# Patient Record
Sex: FEMALE | Race: WHITE | NOT HISPANIC OR LATINO | Employment: UNEMPLOYED | URBAN - NONMETROPOLITAN AREA
[De-identification: names, ages, dates, MRNs, and addresses within clinical notes are randomized per-mention and may not be internally consistent; named-entity substitution may affect disease eponyms.]

---

## 2021-01-11 ENCOUNTER — HOSPITAL ENCOUNTER (INPATIENT)
Facility: HOSPITAL | Age: 41
LOS: 3 days | Discharge: HOME | End: 2021-01-14
Attending: PSYCHIATRY & NEUROLOGY | Admitting: PSYCHIATRY & NEUROLOGY

## 2021-01-11 PROBLEM — R45.851 SUICIDAL IDEATIONS: Status: ACTIVE | Noted: 2021-01-11

## 2021-01-11 LAB — SARS-COV-2 RDRP RESP QL NAA+PROBE: NORMAL

## 2021-01-11 PROCEDURE — 25010000002 HALOPERIDOL LACTATE PER 5 MG: Performed by: PSYCHIATRY & NEUROLOGY

## 2021-01-11 PROCEDURE — 87635 SARS-COV-2 COVID-19 AMP PRB: CPT | Performed by: PSYCHIATRY & NEUROLOGY

## 2021-01-11 PROCEDURE — 25010000002 LORAZEPAM PER 2 MG: Performed by: PSYCHIATRY & NEUROLOGY

## 2021-01-11 PROCEDURE — 25010000002 DIPHENHYDRAMINE PER 50 MG: Performed by: PSYCHIATRY & NEUROLOGY

## 2021-01-11 RX ORDER — ACETAMINOPHEN 325 MG/1
650 TABLET ORAL EVERY 6 HOURS PRN
Status: DISCONTINUED | OUTPATIENT
Start: 2021-01-11 | End: 2021-01-14 | Stop reason: HOSPADM

## 2021-01-11 RX ORDER — ALUMINA, MAGNESIA, AND SIMETHICONE 2400; 2400; 240 MG/30ML; MG/30ML; MG/30ML
15 SUSPENSION ORAL EVERY 6 HOURS PRN
Status: DISCONTINUED | OUTPATIENT
Start: 2021-01-11 | End: 2021-01-14 | Stop reason: HOSPADM

## 2021-01-11 RX ORDER — DIPHENHYDRAMINE HYDROCHLORIDE 50 MG/ML
50 INJECTION INTRAMUSCULAR; INTRAVENOUS ONCE
Status: COMPLETED | OUTPATIENT
Start: 2021-01-11 | End: 2021-01-11

## 2021-01-11 RX ORDER — ECHINACEA PURPUREA EXTRACT 125 MG
2 TABLET ORAL AS NEEDED
Status: DISCONTINUED | OUTPATIENT
Start: 2021-01-11 | End: 2021-01-14 | Stop reason: HOSPADM

## 2021-01-11 RX ORDER — LOPERAMIDE HYDROCHLORIDE 2 MG/1
2 CAPSULE ORAL
Status: DISCONTINUED | OUTPATIENT
Start: 2021-01-11 | End: 2021-01-14 | Stop reason: HOSPADM

## 2021-01-11 RX ORDER — HYDROXYZINE 50 MG/1
50 TABLET, FILM COATED ORAL EVERY 6 HOURS PRN
Status: DISCONTINUED | OUTPATIENT
Start: 2021-01-11 | End: 2021-01-14 | Stop reason: HOSPADM

## 2021-01-11 RX ORDER — FAMOTIDINE 20 MG/1
20 TABLET, FILM COATED ORAL 2 TIMES DAILY PRN
Status: DISCONTINUED | OUTPATIENT
Start: 2021-01-11 | End: 2021-01-14 | Stop reason: HOSPADM

## 2021-01-11 RX ORDER — TRAZODONE HYDROCHLORIDE 50 MG/1
50 TABLET ORAL NIGHTLY PRN
Status: DISCONTINUED | OUTPATIENT
Start: 2021-01-11 | End: 2021-01-14 | Stop reason: HOSPADM

## 2021-01-11 RX ORDER — BENZONATATE 100 MG/1
100 CAPSULE ORAL 3 TIMES DAILY PRN
Status: DISCONTINUED | OUTPATIENT
Start: 2021-01-11 | End: 2021-01-14 | Stop reason: HOSPADM

## 2021-01-11 RX ORDER — BENZTROPINE MESYLATE 1 MG/1
2 TABLET ORAL ONCE AS NEEDED
Status: DISCONTINUED | OUTPATIENT
Start: 2021-01-11 | End: 2021-01-14 | Stop reason: HOSPADM

## 2021-01-11 RX ORDER — LORAZEPAM 2 MG/ML
2 INJECTION INTRAMUSCULAR ONCE
Status: COMPLETED | OUTPATIENT
Start: 2021-01-11 | End: 2021-01-11

## 2021-01-11 RX ORDER — IBUPROFEN 400 MG/1
400 TABLET ORAL EVERY 6 HOURS PRN
Status: DISCONTINUED | OUTPATIENT
Start: 2021-01-11 | End: 2021-01-14 | Stop reason: HOSPADM

## 2021-01-11 RX ORDER — BENZTROPINE MESYLATE 1 MG/ML
1 INJECTION INTRAMUSCULAR; INTRAVENOUS ONCE AS NEEDED
Status: DISCONTINUED | OUTPATIENT
Start: 2021-01-11 | End: 2021-01-14 | Stop reason: HOSPADM

## 2021-01-11 RX ORDER — HALOPERIDOL 5 MG/ML
5 INJECTION INTRAMUSCULAR ONCE
Status: COMPLETED | OUTPATIENT
Start: 2021-01-11 | End: 2021-01-11

## 2021-01-11 RX ORDER — NICOTINE 21 MG/24HR
1 PATCH, TRANSDERMAL 24 HOURS TRANSDERMAL
Status: DISCONTINUED | OUTPATIENT
Start: 2021-01-11 | End: 2021-01-14 | Stop reason: HOSPADM

## 2021-01-11 RX ORDER — ONDANSETRON 4 MG/1
4 TABLET, FILM COATED ORAL EVERY 6 HOURS PRN
Status: DISCONTINUED | OUTPATIENT
Start: 2021-01-11 | End: 2021-01-14 | Stop reason: HOSPADM

## 2021-01-11 RX ADMIN — Medication 1 PATCH: at 18:10

## 2021-01-11 RX ADMIN — HALOPERIDOL LACTATE 5 MG: 5 INJECTION, SOLUTION INTRAMUSCULAR at 17:16

## 2021-01-11 RX ADMIN — DIPHENHYDRAMINE HYDROCHLORIDE 50 MG: 50 INJECTION, SOLUTION INTRAMUSCULAR; INTRAVENOUS at 17:16

## 2021-01-11 RX ADMIN — LORAZEPAM 2 MG: 2 INJECTION INTRAMUSCULAR; INTRAVENOUS at 17:16

## 2021-01-12 RX ORDER — LAMOTRIGINE 100 MG/1
25 TABLET ORAL DAILY
Status: DISCONTINUED | OUTPATIENT
Start: 2021-01-12 | End: 2021-01-14 | Stop reason: HOSPADM

## 2021-01-12 RX ADMIN — Medication 1 PATCH: at 09:56

## 2021-01-12 RX ADMIN — Medication 100 MG: at 14:09

## 2021-01-12 RX ADMIN — LAMOTRIGINE 25 MG: 100 TABLET ORAL at 14:09

## 2021-01-12 RX ADMIN — TRAZODONE HYDROCHLORIDE 50 MG: 50 TABLET ORAL at 20:15

## 2021-01-12 NOTE — DISCHARGE INSTR - APPOINTMENTS
[address] [address]  [phone]    January 18 2021 at 11:00am with Vianney  February 3 2021 at 9:00am with Dr Brady.
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
## 2021-01-12 NOTE — H&P
"Patient Care Team:  Provider, No Known as PCP - General    Chief Complaint: \"I went out of my medications 3 years ago, was on Lamictal Seroquel and Klonopin.  I  started feeling my head spiraling again 6 months ago but did not see any doctor,, began to have suicidal thoughts last weekend, felt my family will be happier without me, started cutting my ankle on Saturday, she does multiple abrasions on her left ankle.. Describes stressors as \"too much pressure on me, my son is autistic and has epilepsy my  has epilepsy and brain damage\"    Subjective     History of Present Illness: Patient is a 40-year-old  female  the second time since 4 years, first marriage lasted 11 years, mother of 6 children ranging in ages from 22-12, currently living with her  and 3 of her younger children also has a stepdaughter 6-year-old at home, currently  being supported financially by her .  She was admitted on 1/11/2021 after she was referred as a direct admission from [facility] where she had presented reporting suicidal thoughts with plans to shoot herself reported that a few days ago she got her 's gun but could not figure out how to use it.    I saw the patient on 1/12/2021 when she listed a chief complaint as described above.   She admitted that she drinks mostly on weekends with her friends but has not drank any in 2 months.  She admits that she smokes pot every day and has been doing this since 10 years    Legal History: She denied that she has ever been in trouble with law    Past Psychiatric History: Patient gives history of a psychiatric hospitalization in [place] 20 years ago.  Says she had taken different medications off and on.  She last was receiving psychiatric treatment in [place] 3 years ago when she took Lamictal Seroquel and Klonopin.  She says she has been diagnosed to have bipolar disorder PTSD and borderline personality.      History Elda Galvez is her primary " care provider in [facility].  She denies any known medical problems.  She has had partial hysterectomy, double hernia operation for umbilical hernia and also transvaginal sling for urinary incontinence  Past Medical History:   Diagnosis Date   • Bipolar disorder (CMS/[facility])    • Obsessive-compulsive disorder    • Self-injurious behavior    • Suicidal thoughts    • Suicide attempt (CMS/[facility])      Past Surgical History:   Procedure Laterality Date   • HYSTERECTOMY       Family History   Family history unknown: Yes     Social History     Tobacco Use   • Smoking status: Current Every Day Smoker   • Smokeless tobacco: Never Used   Substance Use Topics   • Alcohol use: Never     Frequency: Never   • Drug use: Yes     Types: Marijuana     No medications prior to admission.     Allergies:  Aspartame, Aspirin, Paxil [paroxetine], Pseudoephedrine hcl, Sulfa antibiotics, and Tdvax [tetanus-diphtheria toxoids td]  She has also told me that she is allergic to all penicillins tetracyclines and sulfa  Family history her sister has bipolar disorder  Personal history she has 1 year of college education she has not worked any in the last 4 years  Review of Systems:     Constitution: No complaints  Eyes: Negative  ENT: Negative  Respiratory: Negative  Cardiovascular: Negative  Gastrointestinal: Negative  Genitourinary: Negative  Musculoskeletal: Negative  Neurological: Negative    Mental Status Exam:    Hygiene:   good  Cooperation:  Cooperative  Eye Contact:  Good  Psychomotor Behavior:  Appropriate  Affect:  Appropriate  Speech:  Normal  Thought Progress:  Goal directed  Thought Content:  Mood congruent  Suicidal:  None  Homicidal:  None  Hallucinations:  None  Delusion: None  Memory:  Intact  Orientation:  Person, Place and Time  Reliability:  fair  Insight:  Fair  Judgement:  Fair      Physical Exam:      General Appearance:    Alert, cooperative, in no acute distress   Head:    Normocephalic, without obvious abnormality,  atraumatic   Eyes:            Lids and lashes normal, conjunctivae and sclerae normal, no   icterus, no pallor, corneas clear, PERRLA   Ears:    Ears appear intact with no abnormalities noted   Throat:   No oral lesions, no thrush, oral mucosa moist   Neck:   No adenopathy, supple, trachea midline, no thyromegaly, no     carotid bruit, no JVD   Back:     No kyphosis present, no scoliosis present, no skin lesions,       erythema or scars, no tenderness to percussion or                   palpation,   range of motion normal   Lungs:     Clear to auscultation,respirations regular, even and                   unlabored    Heart:    Regular rhythm and normal rate, normal S1 and S2, no            murmur, no gallop, no rub, no click   Breast Exam:    Deferred   Abdomen:     Normal bowel sounds, no masses, no organomegaly, soft        non-tender, non-distended, no guarding, no rebound                 tenderness   Genitalia:    Deferred   Extremities:   Moves all extremities well, no edema, no cyanosis, no              redness   Pulses:   Pulses palpable and equal bilaterally   Skin:   No bleeding, bruising or rash   Lymph nodes:   No palpable adenopathy   Neurologic:   Cranial nerves 2 - 12 grossly intact, sensation intact, DTR        present and equal bilaterally       Objective     Vital Signs    Temp:  [97 °F (36.1 °C)-98.1 °F (36.7 °C)] 98.1 °F (36.7 °C)  Heart Rate:  [79-94] 90  Resp:  [18] 18  BP: (104-128)/(67-88) 104/68    Lab Results:   Lab Results (last 24 hours)     Procedure Component Value Units Date/Time    COVID PRE-OP / PRE-PROCEDURE SCREENING ORDER (NO ISOLATION) - Swab, Nasopharynx [[id number]]  (Normal) Collected: 01/11/21 1717    Specimen: Swab from Nasopharynx Updated: 01/11/21 1741    Narrative:      The following orders were created for panel order COVID PRE-OP / PRE-PROCEDURE SCREENING ORDER (NO ISOLATION) - Swab, Nasopharynx.  Procedure                               Abnormality         Status                      ---------                               -----------         ------                     COVID-19, ABBOTT IN-HOUS...[[id number]]  Normal              Final result                 Please view results for these tests on the individual orders.    COVID-19, ABBOTT IN-HOUSE,NASAL Swab (NO TRANSPORT MEDIA) 2 HR TAT - Swab, Nasopharynx [[id number]]  (Normal) Collected: 01/11/21 1717    Specimen: Swab from Nasopharynx Updated: 01/11/21 1741     COVID19 Presumptive Negative    Narrative:      Fact sheet for providers: https://www.fda.gov/media/593512/download     Fact sheet for patients: https://www.fda.gov/media/151827/download    Test performed by PCR.  If inconsistent with clinical signs and symptoms patient should be tested with different authorized molecular test.           Labs:     Lab Results (last 24 hours)     Procedure Component Value Units Date/Time    COVID PRE-OP / PRE-PROCEDURE SCREENING ORDER (NO ISOLATION) - Swab, Nasopharynx [[id number]]  (Normal) Collected: 01/11/21 1717    Specimen: Swab from Nasopharynx Updated: 01/11/21 1741    Narrative:      The following orders were created for panel order COVID PRE-OP / PRE-PROCEDURE SCREENING ORDER (NO ISOLATION) - Swab, Nasopharynx.  Procedure                               Abnormality         Status                     ---------                               -----------         ------                     COVID-19, ABBOTT IN-HOUS...[[id number]]  Normal              Final result                 Please view results for these tests on the individual orders.    COVID-19, ABBOTT IN-HOUSE,NASAL Swab (NO TRANSPORT MEDIA) 2 HR TAT - Swab, Nasopharynx [[id number]]  (Normal) Collected: 01/11/21 1717    Specimen: Swab from Nasopharynx Updated: 01/11/21 1741     COVID19 Presumptive Negative    Narrative:      Fact sheet for providers: https://www.fda.gov/media/737968/download     Fact sheet for patients: https://www.fda.gov/media/327612/download    Test performed by  PCR.  If inconsistent with clinical signs and symptoms patient should be tested with different authorized molecular test.                          No results found for: WBC, HGB, HCT, MCV, PLT  No results found for: GLUCOSE, BUN, CREATININE, EGFRIFNONA, EGFRIFAFRI, BCR, CO2, CALCIUM, PROTENTOTREF, ALBUMIN, LABIL2, BILIRUBIN, AST, ALT  Pain Management Panel     There is no flowsheet data to display.          Assessment/Diagnosis: Bipolar disorder mixed type per history currently depressed    We will start Lamictal 25 mg daily    Alcohol use disorder episodic last use 2 months ago  Patient denying any craving or withdrawal symptoms currently.  Will monitor with CIWA and give thiamine 100 mg daily.  Encourage cessation  Stimulant abuse provisional  Patient declines any use .  Encourage cessation    Cannabis use disorder  Encourage cessation    PTSD per history  Patient is currently not endorsing any symptoms  Results Review: Patient's urine drug screen report from [facility] on 11/2021 shows positive for amphetamine and THC  Serum alcohol level on 1/11/2021 was less than 11     Assessment/Plan       Suicidal ideations    Treatment Plan discussed with: Patient    I have reviewed and approved the behavioral health treatment plans and problem list. Yes    Lg Hopson MD  01/12/21  11:21 EST      Time:

## 2021-01-12 NOTE — NURSING NOTE
"Presented as a direct admission from [place].  Presented there reporting SI with thoughts to shoot herself.  Reported that a few days ago, she got her 's gun, but could not figure out how to shoot herself with it.  Also, has self inflicted cuts to L lower leg.  Has hx of suicide attempt and inpatient admission \"years ago.\"  Reports that she has been diagnosed with Bipolar 1, but has been off medication for \"a while.\"  Upon arrival to [facility], she became irate and demanding to leave after being informed she would require a covid test.  She was given IM medication for agitation.  At time of admission assessment, she is very drowsy, and minimally able to cooperate with assessment.    "

## 2021-01-12 NOTE — NURSING NOTE
Pt arrived in intake via [facility], was advised by staff that she would need to have a swab performed for Covid 19 per policy, pt began to shake, yell, and demand that staff call her  to come pick her up, states if she had to submit a covid sample she wanted to leave, Pt was advised at this time due to her SI ideation with a plan, she would not be eligible to leave, as it was not safe for her to be discharged at this time.  Pt became very irate and demanded she leave, Lead RN notified and came to intake, pt still argued with her, and demanded she be left alone, pt was yelling and her obscene behavior was escalating.  Dr. Espinoza was notified and new orders were noted to give the patient a B52 IM for agitation and anxiety, pt was agreeable to have her nose swabbed, in exchange for medication to help her calm down.  Swab was obtained and medication was given.

## 2021-01-12 NOTE — PLAN OF CARE
Goal Outcome Evaluation:  Plan of Care Reviewed With: patient  Progress: improving  Outcome Summary: Pt tolerating meds and tx well

## 2021-01-13 RX ADMIN — LAMOTRIGINE 25 MG: 100 TABLET ORAL at 08:19

## 2021-01-13 RX ADMIN — TRAZODONE HYDROCHLORIDE 50 MG: 50 TABLET ORAL at 21:08

## 2021-01-13 RX ADMIN — Medication 100 MG: at 08:20

## 2021-01-13 RX ADMIN — Medication 1 PATCH: at 08:19

## 2021-01-13 NOTE — PLAN OF CARE
Goal Outcome Evaluation:  Plan of Care Reviewed With: patient  Progress: improving  Outcome Summary: Patient reports improvement this shift, is more alert and eating well today. Patient reports anxiety at 5 and depression at 3 on 0-10 scale. No acute distress noted, will continue to monitor.

## 2021-01-13 NOTE — PROGRESS NOTES
LOS: 2 days   Patient Care Team:  Provider, No Known as PCP - General    Chief Complaint: Patient says she is doing better, her anxiety is down, she is not worrying too much, rates at 3, rates depression at 2, she says she is trying to think positive which has helped, is trying not to worry about things that she cannot change..       Interval History: She has slept better, no nightmares, also no flashbacks during the day, hopelessness is down, rates it 3      Interval Review of Systems:   General ROS: negative for - fever or malaise  Endocrine ROS: negative for - palpitations  Respiratory ROS: no cough, shortness of breath, or wheezing  Cardiovascular ROS: no chest pain or dyspnea on exertion  Gastrointestinal ROS: no abdominal pain,no black or bloody stools    Vital Signs    Temp:  [97.6 °F (36.4 °C)-98 °F (36.7 °C)] 97.6 °F (36.4 °C)  Heart Rate:  [78-91] 78  Resp:  [18] 18  BP: (102-131)/(69-86) 123/79    Lab Results:   Lab Results (last 24 hours)     ** No results found for the last 24 hours. **           Labs:     Lab Results (last 24 hours)     ** No results found for the last 24 hours. **                        Exam:  Mental Status Exam:     Hygiene:   good  Cooperation:  Cooperative  Eye Contact:  Good  Psychomotor Behavior:  Appropriate  Affect:  Appropriate  Speech:  Normal  Thought Progress:  Goal directed  Thought Content:  Mood congruent  Suicidal:  None  Homicidal:  None  Hallucinations:  None  Delusion:  None  Memory:  Intact  Orientation:  Person, Place and Time  Reliability:  fair  Insight:  Fair  Judgement:  Fair  Impulse Control: Fair    Results Review:    Lab Results (last 24 hours)     ** No results found for the last 24 hours. **          Medication Review:    Current Facility-Administered Medications:   •  acetaminophen (TYLENOL) tablet 650 mg, 650 mg, Oral, Q6H PRN, Jacky Pope MD  •  aluminum-magnesium hydroxide-simethicone (MAALOX MAX) 400-400-40 MG/5ML suspension 15 mL, 15 mL,  Oral, Q6H PRN, Jacky Pope MD  •  benzonatate (TESSALON) capsule 100 mg, 100 mg, Oral, TID PRN, Jacky Pope MD  •  benztropine (COGENTIN) tablet 2 mg, 2 mg, Oral, Once PRN **OR** benztropine (COGENTIN) injection 1 mg, 1 mg, Intramuscular, Once PRN, Jacky Pope MD  •  famotidine (PEPCID) tablet 20 mg, 20 mg, Oral, BID PRN, Jacky Pope MD  •  hydrOXYzine (ATARAX) tablet 50 mg, 50 mg, Oral, Q6H PRN, Jacky Pope MD  •  ibuprofen (ADVIL,MOTRIN) tablet 400 mg, 400 mg, Oral, Q6H PRN, Jacky Pope MD  •  lamoTRIgine (LaMICtal) tablet 25 mg, 25 mg, Oral, Daily, Lg Hopson MD, 25 mg at 01/13/21 0819  •  loperamide (IMODIUM) capsule 2 mg, 2 mg, Oral, Q2H PRN, Jacky Pope MD  •  magnesium hydroxide (MILK OF MAGNESIA) suspension 2400 mg/10mL 10 mL, 10 mL, Oral, Daily PRN, Jacky Pope MD  •  nicotine (NICODERM CQ) 21 MG/24HR patch 1 patch, 1 patch, Transdermal, Q24H, Jacky Pope MD, 1 patch at 01/13/21 0819  •  ondansetron (ZOFRAN) tablet 4 mg, 4 mg, Oral, Q6H PRN, Jacky Pope MD  •  sodium chloride nasal spray 2 spray, 2 spray, Each Nare, PRN, Jacky Pope MD  •  thiamine (VITAMIN B-1) tablet 100 mg, 100 mg, Oral, Daily, Lg Hopson MD, 100 mg at 01/13/21 0820  •  traZODone (DESYREL) tablet 50 mg, 50 mg, Oral, Nightly PRN, Jacky Pope MD, 50 mg at 01/12/21 2015     Special Precautions Level: III        Assessment/Plan     Assessment:    Assessment/Diagnosis: Bipolar disorder mixed type per history currently depressed     We will start Lamictal 25 mg daily     Alcohol use disorder episodic last use 2 months ago  Patient denying any craving or withdrawal symptoms currently.  Will monitor with CIWA and give thiamine 100 mg daily.  Encourage cessation  Stimulant abuse provisional  Patient declines any use in 2 weeks  Encourage cessation  Patient does not think she needs long-term rehab but is agreeable for outpatient follow-up  Cannabis use  disorder  Encourage cessation     PTSD per history  Patient is currently not endorsing any symptoms      Treatment Plan: No changes          I have reviewed and approved the behavioral health treatment plans and problem list. Yes        Lg Hopson MD  01/13/21  10:00 EST

## 2021-01-13 NOTE — PLAN OF CARE
Problem: Adult Behavioral Health Plan of Care  Goal: Optimized Coping Skills in Response to Life Stressors  Intervention: Promote Effective Coping Strategies  Flowsheets (Taken 1/13/2021 0744 by Annita Anne, RN)  Supportive Measures:   active listening utilized   counseling provided   decision-making supported   goal setting facilitated   positive reinforcement provided   problem-solving facilitated   relaxation techniques promoted   self-care encouraged   self-reflection promoted   self-responsibility promoted   verbalization of feelings encouraged    Patient states that for healthy coping she enjoys doing crafts; sewing; sunny     Problem: Adult Behavioral Health Plan of Care  Goal: Develops/Participates in Therapeutic Stella to Support Successful Transition  Intervention: Foster Therapeutic Stella  Flowsheets (Taken 1/13/2021 0744 by Annita Anne, RN)  Trust Relationship/Rapport:   care explained   choices provided   emotional support provided   empathic listening provided   questions answered   questions encouraged   reassurance provided   thoughts/feelings acknowledged     Problem: Adult Behavioral Health Plan of Care  Goal: Develops/Participates in Therapeutic Stella to Support Successful Transition  Intervention: Mutually Develop Transition Plan  Flowsheets (Taken 1/13/2021 0744 by Annita Anne, RN)  Transition Support:   follow-up care discussed   follow-up care coordinated   Goal Outcome Evaluation:  Plan of Care Reviewed With: patient  Progress: improving    Data: Therapist met with the patient one on one for assessment. Patient states that she is feeling better today. States that she slept a lot after being given an injectable in the ER. She states that she got overwhelmed when she was given a second COVID test and would not allow her  to come back in the ER to see her. Patient states that she is feeling better now and realizes that she needs to be back on her  medication for bi-polar. Dr Hopson met with the patient today and has started Lamictil. We discussed some of the patient's goals for the future which include her getting a part time job; getting back on meds and building the farm. She states that some of her strengths are her mom skills and sense of humor.    Therapist contacted the patient's spouse for collateral/safety planning. Strongly encouraged that all firearms; sharps and medications be secured for safety. He verbalized understanding. He stated that their home phone and internet is currently out and he is trying to get it turned back on.    Assessment: Patient reports her depression and anxiety at a 3 on a 1-10 scale. Denies any suicidal ideation.  Is future oriented.    Plan: Patient will continue hospitalization. Will start Lamictil. Will follow up at [facility].

## 2021-01-14 VITALS
BODY MASS INDEX: 34.72 KG/M2 | HEART RATE: 96 BPM | WEIGHT: 221.2 LBS | TEMPERATURE: 97.9 F | RESPIRATION RATE: 18 BRPM | DIASTOLIC BLOOD PRESSURE: 98 MMHG | SYSTOLIC BLOOD PRESSURE: 133 MMHG | OXYGEN SATURATION: 96 % | HEIGHT: 67 IN

## 2021-01-14 RX ORDER — LAMOTRIGINE 25 MG/1
25 TABLET ORAL DAILY
Qty: 15 TABLET | Refills: 0 | Status: SHIPPED | OUTPATIENT
Start: 2021-01-15 | End: 2021-01-30

## 2021-01-14 RX ORDER — TRAZODONE HYDROCHLORIDE 50 MG/1
50 TABLET ORAL NIGHTLY PRN
Qty: 15 TABLET | Refills: 0 | Status: SHIPPED | OUTPATIENT
Start: 2021-01-14 | End: 2021-01-29

## 2021-01-14 RX ADMIN — Medication 100 MG: at 08:07

## 2021-01-14 RX ADMIN — LAMOTRIGINE 25 MG: 100 TABLET ORAL at 08:07

## 2021-01-14 RX ADMIN — Medication 1 PATCH: at 08:07

## 2021-01-14 NOTE — PLAN OF CARE
Problem: Adult Behavioral Health Plan of Care  Goal: Optimized Coping Skills in Response to Life Stressors  Intervention: Promote Effective Coping Strategies  Flowsheets (Taken 1/13/2021 0744 by Annita Anne, RN)  Supportive Measures:  • active listening utilized  • counseling provided  • decision-making supported  • goal setting facilitated  • positive reinforcement provided  • problem-solving facilitated  • relaxation techniques promoted  • self-care encouraged  • self-reflection promoted  • self-responsibility promoted  • verbalization of feelings encouraged     Problem: Adult Behavioral Health Plan of Care  Goal: Develops/Participates in Therapeutic [place] to Support Successful Transition  Intervention: Foster Therapeutic [place]  Flowsheets (Taken 1/13/2021 0744 by Annita Anne, RN)  Trust Relationship/Rapport:  • care explained  • choices provided  • emotional support provided  • empathic listening provided  • questions answered  • questions encouraged  • reassurance provided  • thoughts/feelings acknowledged     Problem: Adult Behavioral Health Plan of Care  Goal: Develops/Participates in Therapeutic [place] to Support Successful Transition  Intervention: Mutually Develop Transition Plan  Flowsheets (Taken 1/14/2021 0952)  Transition Support:  • community resources reviewed  • crisis management plan promoted  • crisis management plan verbalized  • follow-up care coordinated  • follow-up care discussed    Patient will follow up at [facility]   Goal Outcome Evaluation:  Plan of Care Reviewed With: patient  Progress: improving    Patient has the following appt scheduled:       [place] [id number]  [phone]    January 18 2021 at 11:00am with Vianney  February 3 2021 at 9:00am with Dr Brady.        Therapist spoke with patient's  and safety planning is completed. Patient will return home at discharge with family. Will follow up at  [facility].  will provide transportation for the patient to rerturn home.

## 2021-01-14 NOTE — DISCHARGE SUMMARY
Date of Discharge:  1/14/2021    Presenting Problem/History of Present Illness  Suicidal ideations [R45.851]       Hospital Course  Patient was hospitalized on 1/11/2021 after she was referred as a direct admission from [facility] where she had presented reporting suicidal thoughts with plans to shoot herself she also reported a few days ago she had got her 's gun but could not figure out how to use it, she also reported some alcohol abuse but had not drank any in 2 months prior to admission.  She was placed on special precautions level 3 I saw her on 1/12/2021 when I did the initial history and physical examination started her on Lamictal 25 mg daily, monitored for alcohol withdrawal, and give thiamine 100 mg daily.  Patient was seen daily.  She was noted to be improved on 1/13/2021.  Seen again on 1/14/2021 when she reported doing very well, she denied depression rated at 0, rated anxiety at 1 said this was about being anxious to be with her children again.  She had slept well she denied SI HI hallucinations or paranoia, she was well oriented.  And was requesting discharge.  She was agreeable for follow-up at [facility] in [facility].    Procedures Performed         Consults:   Consults     No orders found from 12/13/2020 to 1/12/2021.                Discharge Disposition      Discharge Medications     Your medication list      You have not been prescribed any medications.         Lab Results (last 24 hours)     ** No results found for the last 24 hours. **        Follow-up Appointments  No future appointments.      Discharge Diagnosis: Bipolar disorder mixed type per history currently depressed    Alcohol use disorder episodic  Stimulant abuse provisional  Cannabis use disordearnestine Hopson MD  01/14/21  09:39 EST

## 2021-01-15 NOTE — PROGRESS NOTES
Behavioral Health Discharge Summary             Please fax within 24 hours of discharge to [facility] at: [phone]      Member Name: Arianna Holland Member ID: [id number]   Authorization Number: [id number] Phone: [phone]   Member Address: [address] [place] [id number]   Discharge Date: 01/14/2021 Level of Care at Discharge:    Facility: [facility] Staff Completing Form: HINA ALVAREZ   If the member is being discharged directly to a residential or extended care program, please specify the type below.   __Private Child-Caring Facility (PCC) Residential/Group Home   __Private Child-Caring Facility (PCC) Therapeutic Foster Care   __Residential Treatment Facility (RTF)   __Psychiatric Residential Treatment Facility (PRTF I or II)   __Long-Term Acute Inpatient Hospital Services or Extended Care Unit (ECU)   __Other (please specify):    Brief discharge summary of treatment received (for follow up by the case management team): D/C clinical with list of medications and follow up appts given to patient upon discharge.     BRIEF SUMMARY OF RECOMMENDATIONS FOR ONGOING TREATMENT     Discharged to where: Steve with Spouse   Discharge diagnoses: F 32.9   Axis I:    Axis II:    Axis III:    Axis IV:    Axis V:    Does the member understand his/her DX?  Yes          Medication     Dose     Schedule Supply/  Quantity  Given at Discharge RX Provided  Yes/No  If Rx Provided, Quantity RX Prior Auth Required  Yes/No Prior Auth  Completed   lamictal  25 mg  Daily         Trazodone  50 mg  HS PRN                                                                               Does the member understand the reason for taking these medications? Yes                                                           FOLLOW-UP APPOINTMENTS   Please schedule within 7 days of discharge and provide appointment details for all referred services.    PCP/Other Providers Involved in Treatment:     Appointment Type: OP   Provider Name: [facility]  Provider Phone:   [phone]     Appointment Date:  01/10/21   02/03/21 Appointment Time:     11:00 AM with Vianney  9:00 AM with Dr VINCENT Olson   (new to OP services)        Case Management    Is the member already enrolled in case management?  Yes/No  If yes, date the CM was notified:    If no, was the CM referral offered?  Yes/No  Accepted? Yes/No    Is the Release of Information in the chart? Yes/No:      Medication Management (for member discharged with psychiatric medications):      A&D Treatment (for member with substance abuse/   dependence in the past year):      Medical Condition (for member with a medical condition):    Other recommended treatment:    Do you have any concerns about the discharge plan?  No    If yes, explain:    Was the member involved in the discharge planning?  Yes    If no, explain:    Was a copy of the discharge plan provided to the member?  Yes    If no, explain: